# Patient Record
Sex: MALE | Race: WHITE
[De-identification: names, ages, dates, MRNs, and addresses within clinical notes are randomized per-mention and may not be internally consistent; named-entity substitution may affect disease eponyms.]

---

## 2018-04-25 ENCOUNTER — HOSPITAL ENCOUNTER (EMERGENCY)
Dept: HOSPITAL 41 - JD.ED | Age: 23
Discharge: HOME | End: 2018-04-25
Payer: SELF-PAY

## 2018-04-25 DIAGNOSIS — L29.9: Primary | ICD-10-CM

## 2018-04-25 NOTE — EDM.PDOC
ED HPI GENERAL MEDICAL PROBLEM





- General


Chief Complaint: Genitourinary Problem


Stated Complaint: POSSIBLE INFECTION


Time Seen by Provider: 04/25/18 15:45


Source of Information: Reports: Patient, RN Notes Reviewed





- History of Present Illness


INITIAL COMMENTS - FREE TEXT/NARRATIVE: 





22-year-old male presents to ED for possible infection. His English is not 

fluent but as best I can understand his wife who lives in [place] has asked he 

be checked out for possible infection. He indicates that he has itchiness of 

his arms and legs, no rash. Triage note stated something about possible herpes 

but I am not getting that information from patient. He does work construction.





- Related Data


 Allergies











Allergy/AdvReac Type Severity Reaction Status Date / Time


 


No Known Allergies Allergy   Verified 04/25/18 15:12











Home Meds: 


 Home Meds





. [No Known Home Meds]  04/25/18 [History]











Past Medical History





- Past Health History


Medical/Surgical History: Denies Medical/Surgical History





Social & Family History





- Tobacco Use


Smoking Status *Q: Never Smoker





- Caffeine Use


Caffeine Use: Reports: None





- Recreational Drug Use


Recreational Drug Use: No





ED ROS GENERAL





- Review of Systems


Review Of Systems: See Below


Constitutional: Denies: Fever


HEENT: Denies: Throat Pain


Respiratory: Denies: Shortness of Breath


Cardiovascular: Denies: Chest Pain


GI/Abdominal: Denies: Abdominal Pain, Vomiting


Skin: Reports: Pruritis.  Denies: Rash





ED EXAM, SKIN/RASH


Exam: See Below


General Appearance: Alert, No Apparent Distress


Eye Exam: Bilateral Eye: PERRL


Throat/Mouth: Normal Inspection


Head: Atraumatic.  No: Facial Swelling


Neck: Supple, Full Range of Motion


Respiratory/Chest: No Respiratory Distress, Lungs Clear, Normal Breath Sounds


Cardiovascular: Regular Rate, Rhythm


 (Male) Exam: Other (No rash or other lesions visible).  No: Penile Lesions, 

Urethral Discharge


Extremities: Normal Inspection, Normal Range of Motion


Skin: Warm, Dry, Normal Color, No Rash (No rash visible face neck trunk upper 

or lower extremities.)





Course





- Vital Signs


Last Recorded V/S: 


 Last Vital Signs











Temp  98.2 F   04/25/18 15:10


 


Pulse  92   04/25/18 15:10


 


Resp  16   04/25/18 15:10


 


BP  139/88   04/25/18 15:10


 


Pulse Ox  100   04/25/18 15:10














Departure





- Departure


Time of Disposition: 16:49


Disposition: Home, Self-Care 01


Condition: Fair


Clinical Impression: 


 Itching








- Discharge Information


Referrals: 


PCP,None [Primary Care Provider] - 


Forms:  ED Department Discharge


Additional Instructions: 


A medical screening exam has been done here in the emergency department this 

afternoon. We are not finding any evidence for infection. We are not finding 

any significant skin rash or lesions at this time. The itchiness that you have 

is likely related to dust or other some other type of exposure from work. 

Claritin is an antihistamine medication that will help your itchiness. 

Recommend dosage is 10 mg daily. Continue that once daily until itchiness has 

completely resolved. If there is further difficulty follow-up at our [facility] 

medical clinic. Call [phone] as needed for appointment. daily